# Patient Record
(demographics unavailable — no encounter records)

---

## 2025-05-31 NOTE — REVIEW OF SYSTEMS
[Negative] : Allergic/Immunologic [Immunizations are up to date by report] : Immunizations are up to date by report [Fever] : no fever [Ecchymoses] : no ecchymoses [Icterus] : no icterus [Epistaxis] : no epistaxis [Bleeding] : no bleeding [Bruising] : no bruising

## 2025-05-31 NOTE — FAMILY HISTORY
[] :  [Black/] : Black/ [Full] : full sister [Age ___] : Age: [unfilled] [Healthy] : healthy [Half] : half sister [de-identified] : asthma

## 2025-05-31 NOTE — CONSULT LETTER
[Dear  ___] : Dear  [unfilled], [Consult Letter:] : I had the pleasure of evaluating your patient, [unfilled]. [Please see my note below.] : Please see my note below. [Consult Closing:] : Thank you very much for allowing me to participate in the care of this patient.  If you have any questions, please do not hesitate to contact me. [Sincerely,] : Sincerely, [FreeTextEntry2] : Dr Barron [FreeTextEntry3] : Melia Saleh MD Pediatric Hematology/Oncology [place] [id number] [phone]

## 2025-05-31 NOTE — HISTORY OF PRESENT ILLNESS
[No Feeding Issues] : no feeding issues at this time [de-identified] : 5m male accompanied by mother, here or confirmatory testing and clinical evaluation for sickle trait with FAS pattern on  screening.  [place] [facility]-Dr Romana Yubantoy.  Denies recent fever or resp illness.  No surgeries, uncircumcised.  Denies abnormal healing of umbilical cord.  Denies bruising, epistaxis, blood in urine or stool.  Received EBM for 6-8 w, did not latch.  Formula Enfamil gentleease, 6 oz every 2-3 hours during day.  Sleeps from 0379-4299. Gaining weight, meeting milestones. Active and alert when awake, tracks with eyes and makes eye contact, smiling.  NKA.  Denies blood transfusion.  Wetting diapers throughout day, 2-3 pasty bowel movements daily. Denies eczema or rashes.  Denies vitamins or supplements, prescription meds.

## 2025-05-31 NOTE — PHYSICAL EXAM
[Reflexes] : reflexes [Normal] : affect appropriate [Pallor] : no pallor [Icterus] : not icterus [Thrush] : no thrush

## 2025-05-31 NOTE — REASON FOR VISIT
WIFE WAS ADVISED-VERBALIZED UNDERSTANDING.    [New Patient/Consultation] : a new patient/consultation for [Mother] : mother [FreeTextEntry2] : Sickle trait, FAS pattern

## 2025-05-31 NOTE — HISTORY OF PRESENT ILLNESS
[No Feeding Issues] : no feeding issues at this time [de-identified] : 5m male accompanied by mother, here or confirmatory testing and clinical evaluation for sickle trait with FAS pattern on  screening.  [place] [facility]-Dr Romana Yubantoy.  Denies recent fever or resp illness.  No surgeries, uncircumcised.  Denies abnormal healing of umbilical cord.  Denies bruising, epistaxis, blood in urine or stool.  Received EBM for 6-8 w, did not latch.  Formula Enfamil gentleease, 6 oz every 2-3 hours during day.  Sleeps from 6169-1820. Gaining weight, meeting milestones. Active and alert when awake, tracks with eyes and makes eye contact, smiling.  NKA.  Denies blood transfusion.  Wetting diapers throughout day, 2-3 pasty bowel movements daily. Denies eczema or rashes.  Denies vitamins or supplements, prescription meds.

## 2025-05-31 NOTE — SOCIAL HISTORY
[Mother] : mother [Father] : father [___ Sisters] : [unfilled] sisters [Secondhand Smoke] : no exposure to  secondhand smoke [FreeTextEntry1] : home, will start  [FreeTextEntry2] :  center [FreeTextEntry3] : home

## 2025-05-31 NOTE — HISTORY OF PRESENT ILLNESS
[No Feeding Issues] : no feeding issues at this time [de-identified] : 5m male accompanied by mother, here or confirmatory testing and clinical evaluation for sickle trait with FAS pattern on  screening.  [place] Emily-Dr Romana Yubantoy.  Denies recent fever or resp illness.  No surgeries, uncircumcised.  Denies abnormal healing of umbilical cord.  Denies bruising, epistaxis, blood in urine or stool.  Received EBM for 6-8 w, did not latch.  Formula Enfamil gentleease, 6 oz every 2-3 hours during day.  Sleeps from 3158-0520. Gaining weight, meeting milestones. Active and alert when awake, tracks with eyes and makes eye contact, smiling.  NKA.  Denies blood transfusion.  Wetting diapers throughout day, 2-3 pasty bowel movements daily. Denies eczema or rashes.  Denies vitamins or supplements, prescription meds.

## 2025-05-31 NOTE — END OF VISIT
[FreeTextEntry3] : patient seen and examined. Attending attestation/summary- 5 mo old with nb screen c/w Sickle trait.  CBC, retic and hgb elec drawn today for confirmatory testing.  Counseled re sickle cell trait including potential implications for future offspring.  f/u heme as needed in the future with any clinical or lab concerns. >50% encounter by attending [Time Spent: ___ minutes] : I have spent [unfilled] minutes of time on the encounter which excludes teaching and separately reported services.

## 2025-05-31 NOTE — CONSULT LETTER
[Dear  ___] : Dear  [unfilled], [Consult Letter:] : I had the pleasure of evaluating your patient, [unfilled]. [Please see my note below.] : Please see my note below. [Consult Closing:] : Thank you very much for allowing me to participate in the care of this patient.  If you have any questions, please do not hesitate to contact me. [Sincerely,] : Sincerely, [FreeTextEntry2] : Dr Barron [FreeTextEntry3] : Melia Saleh MD Pediatric Hematology/Oncology [place] [id number] [phone]

## 2025-05-31 NOTE — REVIEW OF SYSTEMS
You have just had spine surgery.  Please take care of your back by adhering to some basic recommendations.    Your surgeon recommends taking acetaminophen (tylenol) 1000mg 3 times per day for the next 14 to 21 days.  Apply icepacks to the surgical area to reduce swelling and discomfort.  This can help to minimize the need for stronger medications.    No heavy lifting. Do not lift more than 10 pounds.  Avoid twisting and bending over.  Do NOT drive a car.  You may be driven in a car.    You may shower if the dressing is the clear plastic type or if you cover the existing dressing with plastic and tape to prevent it from getting wet.  Change dressing with dry, sterile gauze and tape if it becomes loose, wet or soiled.    You nerve function was monitored during surgery through the use of small needles and stick-on electrodes.  You may find some minor bruising/blood spots, some small needle punctures and have some minor muscular soreness because of this.  There is no intervention necessary for these issues.        Observe low back precautions as described by the Physical Therapist.  Walking is your best exercise.  It is best not to remain in one position for long periods such as long car rides.    Constipation is a common problem associated with surgery and pain medications.  You should ensure that you are drinking plenty of fluids and increasing your fruit and vegetable intake.  You are advised to take Docusate 100mg three times per day to prevent opioid induced constipation.  To treat opioid induced constipation use Senna (Senokot) or Bisacodyl (Dulcolax) or PEG 3350 (Miralax) every evening until your first bowel movement and then every evening as needed.  To treat opioid induced bloating and gas pain use Simethicone (Gas-X) 80 mg per label directions.     Smoking should be avoided.  Tobacco products are associated with back problems.       Call the doctor with questions, fevers, severe headache, bowel or bladder dysfunction, new numbness/weakness or new pain not relieved by medication, ice pack and change of position.    You should see your surgeon for follow up within 14 days of surgery. [Negative] : Allergic/Immunologic [Immunizations are up to date by report] : Immunizations are up to date by report [Fever] : no fever [Ecchymoses] : no ecchymoses [Icterus] : no icterus [Epistaxis] : no epistaxis [Bleeding] : no bleeding [Bruising] : no bruising

## 2025-05-31 NOTE — CONSULT LETTER
[Dear  ___] : Dear  [unfilled], [Consult Letter:] : I had the pleasure of evaluating your patient, [unfilled]. [Please see my note below.] : Please see my note below. [Consult Closing:] : Thank you very much for allowing me to participate in the care of this patient.  If you have any questions, please do not hesitate to contact me. [Sincerely,] : Sincerely, [FreeTextEntry2] : Dr Barron [FreeTextEntry3] : Melia Saleh MD Pediatric Hematology/Oncology [place] [phone]

## 2025-05-31 NOTE — FAMILY HISTORY
[] :  [Black/] : Black/ [Full] : full sister [Age ___] : Age: [unfilled] [Healthy] : healthy [Half] : half sister [de-identified] : asthma

## 2025-05-31 NOTE — FAMILY HISTORY
[] :  [Black/] : Black/ [Full] : full sister [Age ___] : Age: [unfilled] [Healthy] : healthy [Half] : half sister [de-identified] : asthma

## 2025-05-31 NOTE — REASON FOR VISIT
[New Patient/Consultation] : a new patient/consultation for [Mother] : mother [FreeTextEntry2] : Sickle trait, FAS pattern